# Patient Record
Sex: FEMALE | Race: BLACK OR AFRICAN AMERICAN
[De-identification: names, ages, dates, MRNs, and addresses within clinical notes are randomized per-mention and may not be internally consistent; named-entity substitution may affect disease eponyms.]

---

## 2020-01-28 ENCOUNTER — HOSPITAL ENCOUNTER (EMERGENCY)
Dept: HOSPITAL 62 - ER | Age: 55
Discharge: HOME | End: 2020-01-28
Payer: COMMERCIAL

## 2020-01-28 VITALS — SYSTOLIC BLOOD PRESSURE: 175 MMHG | DIASTOLIC BLOOD PRESSURE: 94 MMHG

## 2020-01-28 DIAGNOSIS — R20.0: ICD-10-CM

## 2020-01-28 DIAGNOSIS — Z79.899: ICD-10-CM

## 2020-01-28 DIAGNOSIS — M54.9: ICD-10-CM

## 2020-01-28 DIAGNOSIS — M54.16: Primary | ICD-10-CM

## 2020-01-28 DIAGNOSIS — I10: ICD-10-CM

## 2020-01-28 LAB
ADD MANUAL DIFF: NO
ALBUMIN SERPL-MCNC: 4.2 G/DL (ref 3.5–5)
ALP SERPL-CCNC: 112 U/L (ref 38–126)
ANION GAP SERPL CALC-SCNC: 10 MMOL/L (ref 5–19)
APPEARANCE UR: CLEAR
APTT PPP: YELLOW S
AST SERPL-CCNC: 21 U/L (ref 14–36)
BASOPHILS # BLD AUTO: 0 10^3/UL (ref 0–0.2)
BASOPHILS NFR BLD AUTO: 0.4 % (ref 0–2)
BILIRUB DIRECT SERPL-MCNC: 0.3 MG/DL (ref 0–0.4)
BILIRUB SERPL-MCNC: 0.5 MG/DL (ref 0.2–1.3)
BILIRUB UR QL STRIP: NEGATIVE
BUN SERPL-MCNC: 15 MG/DL (ref 7–20)
CALCIUM: 9.6 MG/DL (ref 8.4–10.2)
CHLORIDE SERPL-SCNC: 104 MMOL/L (ref 98–107)
CO2 SERPL-SCNC: 29 MMOL/L (ref 22–30)
EOSINOPHIL # BLD AUTO: 0.2 10^3/UL (ref 0–0.6)
EOSINOPHIL NFR BLD AUTO: 1.7 % (ref 0–6)
ERYTHROCYTE [DISTWIDTH] IN BLOOD BY AUTOMATED COUNT: 14.4 % (ref 11.5–14)
GLUCOSE SERPL-MCNC: 94 MG/DL (ref 75–110)
GLUCOSE UR STRIP-MCNC: NEGATIVE MG/DL
HCT VFR BLD CALC: 36.9 % (ref 36–47)
HGB BLD-MCNC: 12.5 G/DL (ref 12–15.5)
KETONES UR STRIP-MCNC: NEGATIVE MG/DL
LYMPHOCYTES # BLD AUTO: 2.3 10^3/UL (ref 0.5–4.7)
LYMPHOCYTES NFR BLD AUTO: 25.8 % (ref 13–45)
MCH RBC QN AUTO: 27.1 PG (ref 27–33.4)
MCHC RBC AUTO-ENTMCNC: 33.8 G/DL (ref 32–36)
MCV RBC AUTO: 80 FL (ref 80–97)
MONOCYTES # BLD AUTO: 0.6 10^3/UL (ref 0.1–1.4)
MONOCYTES NFR BLD AUTO: 7.2 % (ref 3–13)
NEUTROPHILS # BLD AUTO: 5.8 10^3/UL (ref 1.7–8.2)
NEUTS SEG NFR BLD AUTO: 64.9 % (ref 42–78)
NITRITE UR QL STRIP: NEGATIVE
PH UR STRIP: 6 [PH] (ref 5–9)
PLATELET # BLD: 282 10^3/UL (ref 150–450)
POTASSIUM SERPL-SCNC: 3.9 MMOL/L (ref 3.6–5)
PROT SERPL-MCNC: 8.1 G/DL (ref 6.3–8.2)
PROT UR STRIP-MCNC: NEGATIVE MG/DL
RBC # BLD AUTO: 4.6 10^6/UL (ref 3.72–5.28)
SP GR UR STRIP: 1.01
TOTAL CELLS COUNTED % (AUTO): 100 %
UROBILINOGEN UR-MCNC: NEGATIVE MG/DL (ref ?–2)
WBC # BLD AUTO: 9 10^3/UL (ref 4–10.5)

## 2020-01-28 PROCEDURE — 81001 URINALYSIS AUTO W/SCOPE: CPT

## 2020-01-28 PROCEDURE — 99284 EMERGENCY DEPT VISIT MOD MDM: CPT

## 2020-01-28 PROCEDURE — 36415 COLL VENOUS BLD VENIPUNCTURE: CPT

## 2020-01-28 PROCEDURE — 72131 CT LUMBAR SPINE W/O DYE: CPT

## 2020-01-28 PROCEDURE — 96372 THER/PROPH/DIAG INJ SC/IM: CPT

## 2020-01-28 PROCEDURE — 80053 COMPREHEN METABOLIC PANEL: CPT

## 2020-01-28 PROCEDURE — 72070 X-RAY EXAM THORAC SPINE 2VWS: CPT

## 2020-01-28 PROCEDURE — 85025 COMPLETE CBC W/AUTO DIFF WBC: CPT

## 2020-01-28 NOTE — ER DOCUMENT REPORT
ED Medical Screen (RME)





- General


Chief Complaint: Numbness


Stated Complaint: RIGHT BACK PAIN


Time Seen by Provider: 01/28/20 14:46


Primary Care Provider: 


EMILIANO AGUILAR MD [Primary Care Provider] - Follow up as needed


TRAVEL OUTSIDE OF THE [place] IN LAST 30 DAYS: No





- HPI


Notes: 





01/28/20 15:00


54-year-old female presents the ED with complaints of having right back numbness

and tingling that extends her before her pelvic area for the last 3 days, states

she has had back pain for the last 3 weeks.  Patient states she did have a rash 

3 weeks ago on her lower back, however it went away.  denies any bowel or 

bladder dysfunction.  Denies any weakness, ambulating without issues.  Her PCP, 

Dr. Aguilar prescribed her anti-inflammatory and muscle relaxers, she states these

did not help.  Denies any recent trauma.  Denies any shortness of breath, chest 

pain.  Reports numbness or tingling does not disc extend down her legs.  Denies 

history of chronic back pain.





I have greeted and performed a rapid initial assessment of this patient.  A 

comprehensive ED assessment and evaluation of the patient, analysis of test 

results and completion of the medical decision making process will be conducted 

by additional ED providers.





PHYSICAL EXAMINATION:





GENERAL: Well-appearing, well-nourished and in no acute distress.





NECK: Normal range of motion





CV: s1, s2 regular 





LUNGS: No respiratory distress





Musculoskeletal: Normal range of motion





NEUROLOGICAL:  Normal speech, normal gait.  Reports numbness to right waist on 

palpation.  No rashes. dtr+2 in BLE equally. 





SKIN: Warm, Dry, normal turgor, no rashes or lesions noted.





- Related Data


Allergies/Adverse Reactions: 


                                        





acetaminophen [From Percocet] Allergy (Severe, Verified 01/28/20 14:48)


   Hives


oxycodone HCl [From Percocet] Allergy (Severe, Verified 01/28/20 14:48)


   Hives








Home Medications: vit d2, pravastatin, losartan/hctz





Past Medical History





- Social History


Frequency of alcohol use: None


Drug Abuse: None





- Past Medical History


Cardiac Medical History: Reports: Hx Hypertension





- Immunizations


Hx Diphtheria, Pertussis, Tetanus Vaccination: Yes





Physical Exam





- Vital signs


Vitals: 





                                        











Temp Pulse Resp BP Pulse Ox


 


 97.4 F   64   18   190/88 H  99 


 


 01/28/20 14:58  01/28/20 14:58  01/28/20 14:58  01/28/20 14:58  01/28/20 14:58














Course





- Vital Signs


Vital signs: 





                                        











Temp Pulse Resp BP Pulse Ox


 


 97.4 F   64   18   190/88 H  99 


 


 01/28/20 14:58  01/28/20 14:58  01/28/20 14:58  01/28/20 14:58  01/28/20 14:58














Doctor's Discharge





- Discharge


Referrals: 


EMILIANO AGUILAR MD [Primary Care Provider] - Follow up as needed

## 2020-01-28 NOTE — RADIOLOGY REPORT (SQ)
EXAM DESCRIPTION:  CT LUMBAR SPINE WITHOUT



COMPLETED DATE/TIME:  1/28/2020 6:00 pm



REASON FOR STUDY:  R lumbar n/t



COMPARISON:  None.



TECHNIQUE:  Axial images acquired through the lumbar spine without intravenous contrast.  Images revi
ewed with lung, soft tissue and bone windows.  Reconstructed coronal and sagittal MPR images reviewed
.  All images stored on PACS.

All CT scanners at this facility use dose modulation, iterative reconstruction, and/or weight based d
osing when appropriate to reduce radiation dose to as low as reasonably achievable (ALARA).

[facility]: Dose Right  [facility]: CareDose    [facility]: Dose Right    [facility]: Teradose 4D    [facility]: Smart Technologies



RADIATION DOSE:  34mGy.



LIMITATIONS:  None.



FINDINGS:  SEGMENTATION: Normal.  No transitional anatomy.

ALIGNMENT: Normal.

VERTEBRAL BODIES: No fractures.  No dislocation.  No acute findings.

DISCS: T11-12, T12-L1, L1-2, L2-3, and L3-4 are unremarkable.

At L4-5, mild to moderate central canal stenosis results from broad diffuse posterior disc bulging an
d bulky bilateral facet and ligament hypertrophy.  This is best shown on axial image 64/99.  There is
 moderate right and mild left foraminal narrowing without exit L4 nerve root impingement.

At L5-S1, a central and left paracentral disc herniation is present containing nitrogen from the disc
 space.  This flattens the thecal sac left greater than right at the takeoff of the bilateral S1 nerv
e roots in the lateral recess, best shown on axial image 75 and sagittal image 23.  Moderate bilatera
l foraminal narrowing is present.

PEDICLES, TRANSVERSE PROCESSES: No fractures.  No dislocation.  No acute findings.

FACETS, POSTERIOR ELEMENTS: No fractures.  No dislocation.

HARDWARE: None in the spine.

VISUALIZED RIBS: No fractures.

SOFT TISSUES: No significant or acute finding in adjacent soft tissues.

OTHER: Vacuum phenomenon bilateral SI joints



IMPRESSION:  Degenerative disc changes most pronounced at L4-5 and L5-S1



TECHNICAL DOCUMENTATION:  JOB ID:  [id number]

Quality ID # [id number]: Final reports with documentation of one or more dose reduction techniques (e.g., Au
tomated exposure control, adjustment of the mA and/or kV according to patient size, use of iterative 
reconstruction technique)

 2011 [employer]- All Rights Reserved



Reading location - IP/workstation name: [facility]

## 2020-01-28 NOTE — RADIOLOGY REPORT (SQ)
EXAM DESCRIPTION:  T SPINE AP/LAT



COMPLETED DATE/TIME:  1/28/2020 5:48 pm



REASON FOR STUDY:  right back pain



COMPARISON:  None.



NUMBER OF VIEWS:  Two views.



TECHNIQUE:  AP and lateral radiographic images acquired of the thoracic spine.



LIMITATIONS:  None.



FINDINGS:  MINERALIZATION: Normal.

ALIGNMENT: Normal.  No scoliosis.

VERTEBRAE: No fracture or bone lesion.  Maintained height, normal segmentation.

DISCS: Mild disc space loss of height with osteophyte formation at T6-7, T7-8, T8-9, T9-10, T10-11.

HARDWARE: None in the spine.

MEDIASTINUM AND SOFT TISSUES: Normal heart size and aortic contour.  No soft tissue abnormality.

VISUALIZED LUNG FIELDS: Clear.

OTHER: No other significant finding.



IMPRESSION:  No acute findings



TECHNICAL DOCUMENTATION:  JOB ID:  [id number]

 2011 [employer]- All Rights Reserved



Reading location - IP/workstation name: [email or web address]

## 2020-01-28 NOTE — ER DOCUMENT REPORT
ED General





- General


Chief Complaint: Numbness


Stated Complaint: RIGHT BACK PAIN


Time Seen by Provider: 01/28/20 14:46


Primary Care Provider: 


YVON MCKEON MD [COMMUNITY BASED STAFF] - Follow up as needed


EMILIANO AGUILAR MD [Primary Care Provider] - Follow up as needed


ROWDY MCNEIL MD [ACTIVE STAFF] - Follow up as needed


Notes: 





CHIEF COMPLAINT: Numbness and pain in the right back





HPI: 54-year-old obese female with a history of hypertension presenting to the 

emergency department complaining of numbness and tingling in the right lateral 

back with pain over the last 3 days.  No incontinence of urine or bowel denies 

abdominal pain.  Denies fever.  Denies dysuria.  Denies nausea or vomiting.  

Patient states that she had back pain in the right back that seemed to extend 

from the area just below the scapula on the right now down into the right lower 

back region.  States she saw her PCP for this and was prescribed a muscle 

relaxer and an anti-inflammatory but states they are not helping with the 

discomfort.  Patient states that 3 weeks ago she also had a small rash that 

appeared over the sacral area of the back.  States it appeared to be fairly 

raised but also dry skin, no vesicles.  The rash has resolved at this point





ROS: See HPI - all other systems were reviewed and are otherwise negative


Constitutional: no fever 


Eyes: no drainage, no blurred vision


ENT: no runny nose, no sore throat


Cardiovascular:  no chest pain 


Resp: no SOB, no cough


GI: no vomiting, no diarrhea, no abdominal pain


: no dysuria


Integumentary: no rash 


Allergy: no hives 


Musculoskeletal: no extremity pain or swelling 


Neurological: + numbness/tingling, no weakness





MEDICATIONS: I agree with the patient medications as charted by the RN.





ALLERGIES: I agree with the allergies as charted by the RN.





PAST MEDICAL HISTORY/PAST SURGICAL HISTORY: Reviewed and agree as charted by RN.





SOCIAL HISTORY: Reviewed and agree as charted by RN.





FAMILY HISTORY: No significant familial comorbid conditions directly related to 

patient complaint





EXAM:


Reviewed vital signs as charted by RN.


CONSTITUTIONAL: Alert and oriented and responds appropriately to questions. 

Well-appearing; well-nourished, mild distress secondary to discomfort


HEAD: Normocephalic; atraumatic


EYES: PERRL; Conjunctivae clear, sclerae non-icteric


ENT: normal nose; no rhinorrhea; moist mucous membranes; pharynx without lesions

noted, no uvula edema or deviation, no tonsillar hypertrophy, phonation normal


NECK: Supple without meningismus; non-tender; no cervical lymphadenopathy, no 

masses


CARD: RRR; no murmurs, no clicks, no rubs, no gallops; symmetric distal pulses


RESP: Normal chest excursion without splinting or tachypnea; breath sounds clear

and equal bilaterally; no wheezes, no rhonchi, no rales, pulse oximetry 


ABD/GI: Obese, normal bowel sounds; non-distended; soft, non-tender, no rebound,

no guarding; no palpable organomegaly or masses.


BACK:  The back appears normal and is mildly tender to palpation over the lower 

thoracic and over the lumbar spine in the right paraspinous musculature and 

laterally over the right back.  Patient has no visible rash on the thorax.  She 

has decreased sensation subjectively extending from T10 area down through the 

top of the sacrum and around to the right flank at approximately the mid 

axillary line, there is no CVA tenderness


EXT: Normal ROM in all joints; non-tender to palpation; no cyanosis, no 

effusions, no edema   


SKIN: Normal color for age and race; warm; dry; good turgor; no acute lesions 

noted


NEURO: Moves all extremities equally; Motor and sensory function intact.  No 

saddle anesthesia on exam.  Strength equal 5/5 bilateral lower extremities.  

DTRs 2+ intact and equal bilateral lower extremities.  Gait is normal


PSYCH: The patient's mood and manner are appropriate. Grooming and personal 

hygiene are appropriate.





MDM: 54-year-old female presenting for painful numbness in the right lateral 

back that seems to cross several radicular lines, no visible rash at this time 

suggesting shingles although patient symptoms do sound suspiciously like 

postherpetic neuralgia.  She has no incontinence that would suggest saddle 

anesthesia, no perineal numbness.  Low suspicion for cauda equina.  Initial 

work-up through triage process.  Will add thoracic spine x-ray.  Given the 

radicular symptoms if imaging studies do not show bone lesion or other 

significant findings anticipate discharge on gabapentin, steroids, pain 

medication with referral to orthopedics or neurology


TRAVEL OUTSIDE OF THE [place] IN LAST 30 DAYS: No





- Related Data


Allergies/Adverse Reactions: 


                                        





acetaminophen [From Percocet] Allergy (Severe, Verified 01/28/20 14:48)


   Hives


oxycodone HCl [From Percocet] Allergy (Severe, Verified 01/28/20 14:48)


   Hives








Home Medications: vit d2, pravastatin, losartan/hctz





Past Medical History





- Social History


Smoking Status: Never Smoker


Frequency of alcohol use: None


Drug Abuse: None


Family History: Other


Patient has suicidal ideation: No


Patient has homicidal ideation: No





- Past Medical History


Cardiac Medical History: Reports: Hx Hypertension





- Immunizations


Hx Diphtheria, Pertussis, Tetanus Vaccination: Yes





Physical Exam





- Vital signs


Vitals: 


                                        











Temp Pulse Resp BP Pulse Ox


 


 97.4 F   64   18   190/88 H  99 


 


 01/28/20 14:58  01/28/20 14:58  01/28/20 14:58  01/28/20 14:58  01/28/20 14:58














Course





- Re-evaluation


Re-evalutation: 





01/28/20 17:46


Lab work does not show any acute emergent abnormalities.  Awaiting imaging 

studies for disposition





01/28/20 18:45


I discussed evaluation at length with the patient.  We discussed results.  Mild 

disc bulge at L4-5 and L5-S1.  Not in the right distribution for her symptoms.  

She has a lateral radiculopathy that may be cutaneous in nature without a 

visible rash.  She states that the discomfort did improve slightly after the 

medication she received here.  I will plan to keep patient on gabapentin, short 

course of pain medication, she states that she can take hydrocodone.  I will 

also place her on a course of steroids.  She will be referred to orthopedics and

neurology for further work-up and evaluation she is also to call her PCP for 

follow-up





- Vital Signs


Vital signs: 


                                        











Temp Pulse Resp BP Pulse Ox


 


 97.4 F   64   18   190/88 H  99 


 


 01/28/20 14:58  01/28/20 14:58  01/28/20 14:58  01/28/20 14:58  01/28/20 14:58














- Laboratory


Result Diagrams: 


                                 01/28/20 16:57





                                 01/28/20 16:57


Laboratory results interpreted by me: 


                                        











  01/28/20 01/28/20





  16:57 16:57


 


RDW  14.4 H 


 


Est GFR (MDRD) Non-Af   59 L














Discharge





- Discharge


Clinical Impression: 


 Radiculopathy of lumbar region





Condition: Stable


Disposition: HOME, SELF-CARE


Additional Instructions: 


Take the medications as prescribed, no driving if taking Vicodin for pain.  

Follow-up closely with your primary care provider, neurology and orthopedics for

further evaluation and management call for appointments.  Watch for onset of a 

rash in the area of the pain distribution.  Your lab work and imaging studies 

today did not show acute emergent abnormalities


Prescriptions: 


Prednisone [Deltasone 20 mg Tablet] 2 tab PO DAILY 5 Days  tablet


Gabapentin [Neurontin 100 mg Capsule] 100 mg PO TID #30 capsule


Hydrocodone/Acetaminophen [Norco 5-325 mg Tablet] 1 tab PO Q4 PRN #12 tablet


 PRN Reason: 


Referrals: 


EMILIANO AGUILAR MD [Primary Care Provider] - Follow up as needed


ROWDY MCNEIL MD [ACTIVE STAFF] - Follow up as needed


YVON MCKEON MD [COMMUNITY BASED STAFF] - Follow up as needed